# Patient Record
Sex: FEMALE | Race: WHITE | NOT HISPANIC OR LATINO | URBAN - METROPOLITAN AREA
[De-identification: names, ages, dates, MRNs, and addresses within clinical notes are randomized per-mention and may not be internally consistent; named-entity substitution may affect disease eponyms.]

---

## 2024-02-11 ENCOUNTER — EMERGENCY (EMERGENCY)
Facility: HOSPITAL | Age: 29
LOS: 1 days | Discharge: HOME | End: 2024-02-11
Attending: EMERGENCY MEDICINE
Payer: MEDICAID

## 2024-02-11 VITALS
WEIGHT: 132.28 LBS | HEIGHT: 68.9 IN | DIASTOLIC BLOOD PRESSURE: 68 MMHG | TEMPERATURE: 98 F | OXYGEN SATURATION: 98 % | RESPIRATION RATE: 18 BRPM | SYSTOLIC BLOOD PRESSURE: 101 MMHG | HEART RATE: 78 BPM

## 2024-02-11 PROCEDURE — 76830 TRANSVAGINAL US NON-OB: CPT | Mod: 26

## 2024-02-11 PROCEDURE — 99284 EMERGENCY DEPT VISIT MOD MDM: CPT | Mod: 25

## 2024-02-11 PROCEDURE — 99284 EMERGENCY DEPT VISIT MOD MDM: CPT

## 2024-02-11 PROCEDURE — 76830 TRANSVAGINAL US NON-OB: CPT

## 2024-02-11 PROCEDURE — 93975 VASCULAR STUDY: CPT | Mod: 26

## 2024-02-11 PROCEDURE — 93975 VASCULAR STUDY: CPT

## 2024-02-11 PROCEDURE — 99053 MED SERV 10PM-8AM 24 HR FAC: CPT

## 2024-02-11 NOTE — ED PROVIDER NOTE - CLINICAL SUMMARY MEDICAL DECISION MAKING FREE TEXT BOX
29-year-old female with no significant PMHx presents to ED as transfer from outside hospital for pelvic ultrasound. Patient presented to outside hospital for 3 days of L sided pelvic pain, constant but worse with movement/walking. Found to have 3.4 x 2.7 x 2.9 cm simple appearing fluid collection, likely representing paraovarian cyst. Patient is well appearing with mild very low left lower quadrant tenderness. Deferring pelvic exam at this time. Given presentation, less of a concern for ovarian torsion. Symptoms likely related to simple cyst, which coincides with her cycle. Given she was sent for ultrasound, will obtain TVUS. No indication for urgent OBGYN evaluation. Will dispo per results, but anticipate d/c with outpatient GYN f/u.

## 2024-02-11 NOTE — ED PROVIDER NOTE - NSFOLLOWUPINSTRUCTIONS_ED_ALL_ED_FT
Today you were seen in the ED for transvaginal ultrasound.     A copy of your results are attached in this document below.     We recommend following up with your primary care provider within the next 7 days.     An urgent referral has been placed for evaluation by an OBGYN. Someone should contact you to set up this appointment, if you would like to set this appointment.     A prescription has been sent to your pharmacy electronically for antibiotics to treat the urinary tract infection diagnosed at [facility]. Please take this as prescribed.     SEEK IMMEDIATE MEDICAL CARE IF YOU HAVE ANY OF THE FOLLOWING SYMPTOMS: severe chest pain, difficulty breathing, fainting, fevers that persist despite taking medications over the counter, vomiting blood, dark or bloody stools, inability to tolerate eating and drinking food by mouth

## 2024-02-11 NOTE — ED PROVIDER NOTE - PHYSICAL EXAMINATION
Gen: well appearing, in no acute distress   Head: normal appearing  HEENT: normal conjunctiva, oral mucosa moist, vision grossly intact   Lung: no respiratory distress, speaking in full sentences, CTA b/l, no wheeze, crackles or rhonchi   CV: regular rate and rhythm, no murmurs  Abd: soft, non distended, mild L lower quadrant tenderness, no CVA tenderness   MSK: no visible deformities, ambulating without difficulty   Neuro: No focal deficits, AAOx3  Skin: Warm, no rashes   Psych: normal affect  Pelvic Exam: deferred

## 2024-02-11 NOTE — ED PROVIDER NOTE - PATIENT PORTAL LINK FT
You can access the FollowMyHealth Patient Portal offered by [facility] [place] by registering at the following website: [email or web address]. By joining [facility]’s FollowMyHealth portal, you will also be able to view your health information using other applications (apps) compatible with our system.

## 2024-02-11 NOTE — ED ADULT NURSE NOTE - OBJECTIVE STATEMENT
29yF no pmhx presents as transfer for ovarian cyst on CT. pt c/o RAC #20 in place from [facility]. per EMS [place] doesn't have US or GYN.  pt w/ no complaints at this time. awaiting MD hwang.

## 2024-02-11 NOTE — ED PROVIDER NOTE - OBJECTIVE STATEMENT
29-year-old female with no significant PMHx presents to ED as transfer from outside hospital for pelvic ultrasound. Patient presented to outside hospital for 3 days of L sided pelvic pain, constant but worse with movement/walking. States pain is similar to the pain she typically gets before starting her menstrual cycle. Has not seen an OBGYN in over 7 years, unsure of last pap. Denies fevers, chills, chest pain, difficulty breathing, dysuria, hematuria, back pain, vaginal bleeding or discharge. NKDA. At outside hospital, was found to have paraovarian cyst. The hospital did not have ultrasound capability and transferred her here for ultrasound and urgent OBGYN evaluation. She was treated with Ceftriaxone for UA with evidence of WBCs and + leukesterase. 29-year-old female with no significant PMHx presents to ED as transfer from outside hospital for pelvic ultrasound. Patient presented to outside hospital for 3 days of L sided pelvic pain, constant but worse with movement/walking. States pain is similar to the pain she typically gets before starting her menstrual cycle. Has not seen an OBGYN in over 7 years, unsure of last pap. Denies fevers, chills, chest pain, difficulty breathing, dysuria, hematuria, back pain, vaginal bleeding or discharge. NKDA. At outside hospital, was found to have paraovarian cyst. The hospital did not have ultrasound capability and transferred her here for ultrasound and urgent OBGYN evaluation. She was treated with Ceftriaxone for UA with evidence of WBCs and + leukesterase. Not presently endorsing urinary sx. 29-year-old female with no significant PMHx presents to ED as transfer from outside hospital for pelvic ultrasound. Patient presented to outside hospital for 3 days of L sided pelvic pain, constant but worse with movement/walking. States pain is similar to the pain she typically gets before starting her menstrual cycle. Has not seen an OBGYN in over 7 years, unsure of last pap. Denies fevers, chills, chest pain, difficulty breathing, dysuria, hematuria, back pain, vaginal bleeding or discharge. NKDA. At outside hospital, was found to have paraovarian cyst. The hospital did not have ultrasound capability and transferred her here for ultrasound and urgent OBGYN evaluation. She was treated with Ceftriaxone for UA with evidence of WBCs and + leukesterase. Not presently endorsing urinary sx.    [place] female presents with left lower abdominal pain earlier this evening.  had same symptoms 1 month ago right before her last period.  pain improved now.  sent for pelvic ultrasound and possible GYN consultation.

## 2024-02-11 NOTE — ED ADULT TRIAGE NOTE - CCCP TRG CHIEF CMPLNT
She needs labs, urine and lumbar spine xray  GYN appt  For January CT chest and abdo u/s  
ovarian cyst

## 2024-02-11 NOTE — ED ADULT TRIAGE NOTE - PATIENT ON (OXYGEN DELIVERY METHOD)
[Clear] : right tympanic membrane clear [Erythema] : erythema [NL] : clear to auscultation bilaterally [Normal S1, S2 audible] : normal S1, S2 audible [Soft] : soft [NonTender] : non tender [FreeTextEntry4] : slight inflamed turbinates room air

## 2024-02-11 NOTE — ED ADULT NURSE NOTE - NSFALLUNIVINTERV_ED_ALL_ED
Bed/Stretcher in lowest position, wheels locked, appropriate side rails in place/Call bell, personal items and telephone in reach/Instruct patient to call for assistance before getting out of bed/chair/stretcher/Non-slip footwear applied when patient is off stretcher/[place] to call system/Physically safe environment - no spills, clutter or unnecessary equipment/Purposeful proactive rounding/Room/bathroom lighting operational, light cord in reach

## 2024-02-16 PROBLEM — Z00.00 ENCOUNTER FOR PREVENTIVE HEALTH EXAMINATION: Status: ACTIVE | Noted: 2024-02-16

## 2024-02-21 ENCOUNTER — APPOINTMENT (OUTPATIENT)
Dept: OBGYN | Facility: CLINIC | Age: 29
End: 2024-02-21